# Patient Record
Sex: MALE | Race: WHITE | Employment: FULL TIME | URBAN - METROPOLITAN AREA
[De-identification: names, ages, dates, MRNs, and addresses within clinical notes are randomized per-mention and may not be internally consistent; named-entity substitution may affect disease eponyms.]

---

## 2019-11-22 SDOH — HEALTH STABILITY: MENTAL HEALTH: HOW OFTEN DO YOU HAVE A DRINK CONTAINING ALCOHOL?: NEVER

## 2019-11-27 ENCOUNTER — HOSPITAL ENCOUNTER (OUTPATIENT)
Dept: GENERAL RADIOLOGY | Age: 64
Discharge: HOME | End: 2019-11-27
Payer: COMMERCIAL

## 2019-11-27 ENCOUNTER — HOSPITAL ENCOUNTER (OUTPATIENT)
Age: 64
Discharge: HOME | End: 2019-11-27
Payer: COMMERCIAL

## 2019-11-27 DIAGNOSIS — Z01.818 PREOP TESTING: ICD-10-CM

## 2019-11-27 LAB
A/G RATIO: 1.6 (ref 1.1–2.2)
ABO/RH: NORMAL
ALBUMIN SERPL-MCNC: 4.4 G/DL (ref 3.4–5)
ALP BLD-CCNC: 50 U/L (ref 40–129)
ALT SERPL-CCNC: 20 U/L (ref 10–40)
ANION GAP SERPL CALCULATED.3IONS-SCNC: 11 MMOL/L (ref 3–16)
ANTIBODY SCREEN: NORMAL
APTT: 31.5 SEC (ref 24.2–36.2)
AST SERPL-CCNC: 24 U/L (ref 15–37)
BASOPHILS ABSOLUTE: 0 K/UL (ref 0–0.2)
BASOPHILS RELATIVE PERCENT: 0.8 %
BILIRUB SERPL-MCNC: 0.4 MG/DL (ref 0–1)
BUN BLDV-MCNC: 11 MG/DL (ref 7–20)
CALCIUM SERPL-MCNC: 9.6 MG/DL (ref 8.3–10.6)
CHLORIDE BLD-SCNC: 106 MMOL/L (ref 99–110)
CO2: 25 MMOL/L (ref 21–32)
CREAT SERPL-MCNC: 1.1 MG/DL (ref 0.8–1.3)
EKG ATRIAL RATE: 50 BPM
EKG DIAGNOSIS: NORMAL
EKG P AXIS: 22 DEGREES
EKG P-R INTERVAL: 150 MS
EKG Q-T INTERVAL: 424 MS
EKG QRS DURATION: 92 MS
EKG QTC CALCULATION (BAZETT): 386 MS
EKG R AXIS: 7 DEGREES
EKG T AXIS: 26 DEGREES
EKG VENTRICULAR RATE: 50 BPM
EOSINOPHILS ABSOLUTE: 0.2 K/UL (ref 0–0.6)
EOSINOPHILS RELATIVE PERCENT: 3.6 %
GFR AFRICAN AMERICAN: >60
GFR NON-AFRICAN AMERICAN: >60
GLOBULIN: 2.8 G/DL
GLUCOSE BLD-MCNC: 106 MG/DL (ref 70–99)
HCT VFR BLD CALC: 47.3 % (ref 40.5–52.5)
HEMOGLOBIN: 15.6 G/DL (ref 13.5–17.5)
INR BLD: 0.98 (ref 0.86–1.14)
LYMPHOCYTES ABSOLUTE: 1.2 K/UL (ref 1–5.1)
LYMPHOCYTES RELATIVE PERCENT: 20.1 %
MCH RBC QN AUTO: 29.2 PG (ref 26–34)
MCHC RBC AUTO-ENTMCNC: 33 G/DL (ref 31–36)
MCV RBC AUTO: 88.5 FL (ref 80–100)
MONOCYTES ABSOLUTE: 0.4 K/UL (ref 0–1.3)
MONOCYTES RELATIVE PERCENT: 7.5 %
NEUTROPHILS ABSOLUTE: 4 K/UL (ref 1.7–7.7)
NEUTROPHILS RELATIVE PERCENT: 68 %
PDW BLD-RTO: 13.6 % (ref 12.4–15.4)
PLATELET # BLD: 207 K/UL (ref 135–450)
PMV BLD AUTO: 9.3 FL (ref 5–10.5)
POTASSIUM SERPL-SCNC: 4.7 MMOL/L (ref 3.5–5.1)
PROTHROMBIN TIME: 11.4 SEC (ref 10–13.2)
RBC # BLD: 5.34 M/UL (ref 4.2–5.9)
SODIUM BLD-SCNC: 142 MMOL/L (ref 136–145)
TOTAL PROTEIN: 7.2 G/DL (ref 6.4–8.2)
WBC # BLD: 5.9 K/UL (ref 4–11)

## 2019-11-27 PROCEDURE — 71046 X-RAY EXAM CHEST 2 VIEWS: CPT

## 2019-11-27 PROCEDURE — 86901 BLOOD TYPING SEROLOGIC RH(D): CPT

## 2019-11-27 PROCEDURE — 93005 ELECTROCARDIOGRAM TRACING: CPT | Performed by: UROLOGY

## 2019-11-27 PROCEDURE — 93010 ELECTROCARDIOGRAM REPORT: CPT | Performed by: INTERNAL MEDICINE

## 2019-11-27 PROCEDURE — 85730 THROMBOPLASTIN TIME PARTIAL: CPT

## 2019-11-27 PROCEDURE — 86850 RBC ANTIBODY SCREEN: CPT

## 2019-11-27 PROCEDURE — 85025 COMPLETE CBC W/AUTO DIFF WBC: CPT

## 2019-11-27 PROCEDURE — 80053 COMPREHEN METABOLIC PANEL: CPT

## 2019-11-27 PROCEDURE — 36415 COLL VENOUS BLD VENIPUNCTURE: CPT

## 2019-11-27 PROCEDURE — 85610 PROTHROMBIN TIME: CPT

## 2019-11-27 PROCEDURE — 86900 BLOOD TYPING SEROLOGIC ABO: CPT

## 2019-12-17 ENCOUNTER — ANESTHESIA EVENT (OUTPATIENT)
Dept: OPERATING ROOM | Age: 64
End: 2019-12-17
Payer: COMMERCIAL

## 2019-12-17 ENCOUNTER — HOSPITAL ENCOUNTER (OUTPATIENT)
Age: 64
Setting detail: OBSERVATION
Discharge: HOME | End: 2019-12-18
Attending: UROLOGY | Admitting: UROLOGY
Payer: COMMERCIAL

## 2019-12-17 ENCOUNTER — ANESTHESIA (OUTPATIENT)
Dept: OPERATING ROOM | Age: 64
End: 2019-12-17
Payer: COMMERCIAL

## 2019-12-17 VITALS
OXYGEN SATURATION: 99 % | RESPIRATION RATE: 3 BRPM | SYSTOLIC BLOOD PRESSURE: 139 MMHG | DIASTOLIC BLOOD PRESSURE: 85 MMHG | TEMPERATURE: 95.9 F

## 2019-12-17 DIAGNOSIS — Z01.818 PREOP TESTING: Primary | ICD-10-CM

## 2019-12-17 DIAGNOSIS — C61 PROSTATE CANCER (HCC): ICD-10-CM

## 2019-12-17 LAB
ABO/RH: NORMAL
ANTIBODY SCREEN: NORMAL

## 2019-12-17 PROCEDURE — 94760 N-INVAS EAR/PLS OXIMETRY 1: CPT

## 2019-12-17 PROCEDURE — 7100000001 HC PACU RECOVERY - ADDTL 15 MIN: Performed by: UROLOGY

## 2019-12-17 PROCEDURE — 2580000003 HC RX 258: Performed by: UROLOGY

## 2019-12-17 PROCEDURE — 6360000002 HC RX W HCPCS: Performed by: ANESTHESIOLOGY

## 2019-12-17 PROCEDURE — 6360000002 HC RX W HCPCS: Performed by: NURSE ANESTHETIST, CERTIFIED REGISTERED

## 2019-12-17 PROCEDURE — 6370000000 HC RX 637 (ALT 250 FOR IP): Performed by: UROLOGY

## 2019-12-17 PROCEDURE — 3600000009 HC SURGERY ROBOT BASE: Performed by: UROLOGY

## 2019-12-17 PROCEDURE — 36415 COLL VENOUS BLD VENIPUNCTURE: CPT

## 2019-12-17 PROCEDURE — 88309 TISSUE EXAM BY PATHOLOGIST: CPT

## 2019-12-17 PROCEDURE — 6360000002 HC RX W HCPCS: Performed by: UROLOGY

## 2019-12-17 PROCEDURE — 86901 BLOOD TYPING SEROLOGIC RH(D): CPT

## 2019-12-17 PROCEDURE — 86900 BLOOD TYPING SEROLOGIC ABO: CPT

## 2019-12-17 PROCEDURE — C9290 INJ, BUPIVACAINE LIPOSOME: HCPCS | Performed by: UROLOGY

## 2019-12-17 PROCEDURE — 2720000010 HC SURG SUPPLY STERILE: Performed by: UROLOGY

## 2019-12-17 PROCEDURE — 1200000000 HC SEMI PRIVATE

## 2019-12-17 PROCEDURE — 3700000001 HC ADD 15 MINUTES (ANESTHESIA): Performed by: UROLOGY

## 2019-12-17 PROCEDURE — 2580000003 HC RX 258: Performed by: NURSE ANESTHETIST, CERTIFIED REGISTERED

## 2019-12-17 PROCEDURE — 3700000000 HC ANESTHESIA ATTENDED CARE: Performed by: UROLOGY

## 2019-12-17 PROCEDURE — 6370000000 HC RX 637 (ALT 250 FOR IP)

## 2019-12-17 PROCEDURE — 2709999900 HC NON-CHARGEABLE SUPPLY: Performed by: UROLOGY

## 2019-12-17 PROCEDURE — 2500000003 HC RX 250 WO HCPCS: Performed by: UROLOGY

## 2019-12-17 PROCEDURE — 7100000000 HC PACU RECOVERY - FIRST 15 MIN: Performed by: UROLOGY

## 2019-12-17 PROCEDURE — S2900 ROBOTIC SURGICAL SYSTEM: HCPCS | Performed by: UROLOGY

## 2019-12-17 PROCEDURE — G0378 HOSPITAL OBSERVATION PER HR: HCPCS

## 2019-12-17 PROCEDURE — 2580000003 HC RX 258: Performed by: ANESTHESIOLOGY

## 2019-12-17 PROCEDURE — 86850 RBC ANTIBODY SCREEN: CPT

## 2019-12-17 PROCEDURE — 3600000019 HC SURGERY ROBOT ADDTL 15MIN: Performed by: UROLOGY

## 2019-12-17 PROCEDURE — 2500000003 HC RX 250 WO HCPCS: Performed by: NURSE ANESTHETIST, CERTIFIED REGISTERED

## 2019-12-17 PROCEDURE — 88305 TISSUE EXAM BY PATHOLOGIST: CPT

## 2019-12-17 RX ORDER — NEOSTIGMINE METHYLSULFATE 5 MG/5 ML
SYRINGE (ML) INTRAVENOUS PRN
Status: DISCONTINUED | OUTPATIENT
Start: 2019-12-17 | End: 2019-12-17 | Stop reason: SDUPTHER

## 2019-12-17 RX ORDER — CEFAZOLIN SODIUM 2 G/100ML
2 INJECTION, SOLUTION INTRAVENOUS EVERY 8 HOURS
Status: COMPLETED | OUTPATIENT
Start: 2019-12-17 | End: 2019-12-18

## 2019-12-17 RX ORDER — KETAMINE HCL IN NACL, ISO-OSM 100MG/10ML
SYRINGE (ML) INJECTION PRN
Status: DISCONTINUED | OUTPATIENT
Start: 2019-12-17 | End: 2019-12-17 | Stop reason: SDUPTHER

## 2019-12-17 RX ORDER — LIDOCAINE HYDROCHLORIDE 10 MG/ML
1 INJECTION, SOLUTION EPIDURAL; INFILTRATION; INTRACAUDAL; PERINEURAL
Status: DISCONTINUED | OUTPATIENT
Start: 2019-12-17 | End: 2019-12-17 | Stop reason: HOSPADM

## 2019-12-17 RX ORDER — CEFAZOLIN SODIUM 2 G/100ML
2 INJECTION, SOLUTION INTRAVENOUS
Status: COMPLETED | OUTPATIENT
Start: 2019-12-17 | End: 2019-12-17

## 2019-12-17 RX ORDER — HYDROCODONE BITARTRATE AND ACETAMINOPHEN 5; 325 MG/1; MG/1
1 TABLET ORAL
Status: DISCONTINUED | OUTPATIENT
Start: 2019-12-17 | End: 2019-12-17 | Stop reason: HOSPADM

## 2019-12-17 RX ORDER — LANOLIN ALCOHOL/MO/W.PET/CERES
3 CREAM (GRAM) TOPICAL NIGHTLY PRN
Status: DISCONTINUED | OUTPATIENT
Start: 2019-12-17 | End: 2019-12-18 | Stop reason: HOSPADM

## 2019-12-17 RX ORDER — OXYCODONE HYDROCHLORIDE 5 MG/1
5 TABLET ORAL EVERY 4 HOURS PRN
Status: DISCONTINUED | OUTPATIENT
Start: 2019-12-17 | End: 2019-12-18 | Stop reason: HOSPADM

## 2019-12-17 RX ORDER — SODIUM CHLORIDE, SODIUM LACTATE, POTASSIUM CHLORIDE, CALCIUM CHLORIDE 600; 310; 30; 20 MG/100ML; MG/100ML; MG/100ML; MG/100ML
INJECTION, SOLUTION INTRAVENOUS CONTINUOUS
Status: DISCONTINUED | OUTPATIENT
Start: 2019-12-17 | End: 2019-12-17

## 2019-12-17 RX ORDER — LIDOCAINE HYDROCHLORIDE 10 MG/ML
0.5 INJECTION, SOLUTION EPIDURAL; INFILTRATION; INTRACAUDAL; PERINEURAL ONCE
Status: DISCONTINUED | OUTPATIENT
Start: 2019-12-17 | End: 2019-12-17 | Stop reason: HOSPADM

## 2019-12-17 RX ORDER — SODIUM CHLORIDE 9 MG/ML
INJECTION, SOLUTION INTRAVENOUS CONTINUOUS
Status: DISCONTINUED | OUTPATIENT
Start: 2019-12-17 | End: 2019-12-18 | Stop reason: HOSPADM

## 2019-12-17 RX ORDER — ACETAMINOPHEN 325 MG/1
TABLET ORAL
Status: COMPLETED
Start: 2019-12-17 | End: 2019-12-17

## 2019-12-17 RX ORDER — ONDANSETRON 2 MG/ML
INJECTION INTRAMUSCULAR; INTRAVENOUS PRN
Status: DISCONTINUED | OUTPATIENT
Start: 2019-12-17 | End: 2019-12-17 | Stop reason: SDUPTHER

## 2019-12-17 RX ORDER — FENTANYL CITRATE 50 UG/ML
INJECTION, SOLUTION INTRAMUSCULAR; INTRAVENOUS PRN
Status: DISCONTINUED | OUTPATIENT
Start: 2019-12-17 | End: 2019-12-17 | Stop reason: SDUPTHER

## 2019-12-17 RX ORDER — ONDANSETRON 2 MG/ML
4 INJECTION INTRAMUSCULAR; INTRAVENOUS
Status: DISCONTINUED | OUTPATIENT
Start: 2019-12-17 | End: 2019-12-17 | Stop reason: HOSPADM

## 2019-12-17 RX ORDER — HYDROCODONE BITARTRATE AND ACETAMINOPHEN 5; 325 MG/1; MG/1
1 TABLET ORAL EVERY 6 HOURS PRN
Qty: 12 TABLET | Refills: 0 | Status: SHIPPED | OUTPATIENT
Start: 2019-12-17 | End: 2019-12-20

## 2019-12-17 RX ORDER — MORPHINE SULFATE 2 MG/ML
2 INJECTION, SOLUTION INTRAMUSCULAR; INTRAVENOUS
Status: DISCONTINUED | OUTPATIENT
Start: 2019-12-17 | End: 2019-12-18 | Stop reason: HOSPADM

## 2019-12-17 RX ORDER — ACETAMINOPHEN 325 MG/1
650 TABLET ORAL EVERY 6 HOURS
Status: DISCONTINUED | OUTPATIENT
Start: 2019-12-17 | End: 2019-12-18 | Stop reason: HOSPADM

## 2019-12-17 RX ORDER — ATROPA BELLADONNA AND OPIUM 16.2; 3 MG/1; MG/1
30 SUPPOSITORY RECTAL EVERY 8 HOURS PRN
Status: DISCONTINUED | OUTPATIENT
Start: 2019-12-17 | End: 2019-12-18 | Stop reason: HOSPADM

## 2019-12-17 RX ORDER — OXYCODONE HYDROCHLORIDE 5 MG/1
10 TABLET ORAL EVERY 4 HOURS PRN
Status: DISCONTINUED | OUTPATIENT
Start: 2019-12-17 | End: 2019-12-18 | Stop reason: HOSPADM

## 2019-12-17 RX ORDER — GINSENG 100 MG
CAPSULE ORAL 2 TIMES DAILY
Status: DISCONTINUED | OUTPATIENT
Start: 2019-12-17 | End: 2019-12-18 | Stop reason: HOSPADM

## 2019-12-17 RX ORDER — SODIUM CHLORIDE 0.9 % (FLUSH) 0.9 %
10 SYRINGE (ML) INJECTION EVERY 12 HOURS SCHEDULED
Status: DISCONTINUED | OUTPATIENT
Start: 2019-12-17 | End: 2019-12-18 | Stop reason: HOSPADM

## 2019-12-17 RX ORDER — LIDOCAINE HYDROCHLORIDE 20 MG/ML
JELLY TOPICAL
Status: COMPLETED | OUTPATIENT
Start: 2019-12-17 | End: 2019-12-17

## 2019-12-17 RX ORDER — SODIUM CHLORIDE 9 MG/ML
INJECTION, SOLUTION INTRAVENOUS CONTINUOUS PRN
Status: DISCONTINUED | OUTPATIENT
Start: 2019-12-17 | End: 2019-12-17 | Stop reason: SDUPTHER

## 2019-12-17 RX ORDER — MAGNESIUM HYDROXIDE 1200 MG/15ML
LIQUID ORAL CONTINUOUS PRN
Status: COMPLETED | OUTPATIENT
Start: 2019-12-17 | End: 2019-12-17

## 2019-12-17 RX ORDER — MORPHINE SULFATE 4 MG/ML
4 INJECTION, SOLUTION INTRAMUSCULAR; INTRAVENOUS
Status: DISCONTINUED | OUTPATIENT
Start: 2019-12-17 | End: 2019-12-18 | Stop reason: HOSPADM

## 2019-12-17 RX ORDER — PROPOFOL 10 MG/ML
INJECTION, EMULSION INTRAVENOUS PRN
Status: DISCONTINUED | OUTPATIENT
Start: 2019-12-17 | End: 2019-12-17 | Stop reason: SDUPTHER

## 2019-12-17 RX ORDER — SODIUM CHLORIDE 0.9 % (FLUSH) 0.9 %
10 SYRINGE (ML) INJECTION PRN
Status: DISCONTINUED | OUTPATIENT
Start: 2019-12-17 | End: 2019-12-18 | Stop reason: HOSPADM

## 2019-12-17 RX ORDER — ATROPA BELLADONNA AND OPIUM 16.2; 6 MG/1; MG/1
SUPPOSITORY RECTAL
Status: COMPLETED | OUTPATIENT
Start: 2019-12-17 | End: 2019-12-17

## 2019-12-17 RX ORDER — ONDANSETRON 2 MG/ML
4 INJECTION INTRAMUSCULAR; INTRAVENOUS EVERY 6 HOURS PRN
Status: DISCONTINUED | OUTPATIENT
Start: 2019-12-17 | End: 2019-12-18 | Stop reason: HOSPADM

## 2019-12-17 RX ORDER — SENNA AND DOCUSATE SODIUM 50; 8.6 MG/1; MG/1
1 TABLET, FILM COATED ORAL 2 TIMES DAILY
Status: DISCONTINUED | OUTPATIENT
Start: 2019-12-17 | End: 2019-12-18 | Stop reason: HOSPADM

## 2019-12-17 RX ORDER — BISACODYL 10 MG
10 SUPPOSITORY, RECTAL RECTAL DAILY PRN
Status: DISCONTINUED | OUTPATIENT
Start: 2019-12-17 | End: 2019-12-18 | Stop reason: HOSPADM

## 2019-12-17 RX ORDER — HYDROMORPHONE HCL 110MG/55ML
0.5 PATIENT CONTROLLED ANALGESIA SYRINGE INTRAVENOUS EVERY 5 MIN PRN
Status: DISCONTINUED | OUTPATIENT
Start: 2019-12-17 | End: 2019-12-17 | Stop reason: HOSPADM

## 2019-12-17 RX ORDER — HYDROMORPHONE HCL 110MG/55ML
0.25 PATIENT CONTROLLED ANALGESIA SYRINGE INTRAVENOUS EVERY 5 MIN PRN
Status: DISCONTINUED | OUTPATIENT
Start: 2019-12-17 | End: 2019-12-17 | Stop reason: HOSPADM

## 2019-12-17 RX ORDER — LIDOCAINE HYDROCHLORIDE 20 MG/ML
INJECTION, SOLUTION EPIDURAL; INFILTRATION; INTRACAUDAL; PERINEURAL PRN
Status: DISCONTINUED | OUTPATIENT
Start: 2019-12-17 | End: 2019-12-17 | Stop reason: SDUPTHER

## 2019-12-17 RX ORDER — DEXAMETHASONE SODIUM PHOSPHATE 4 MG/ML
INJECTION, SOLUTION INTRA-ARTICULAR; INTRALESIONAL; INTRAMUSCULAR; INTRAVENOUS; SOFT TISSUE PRN
Status: DISCONTINUED | OUTPATIENT
Start: 2019-12-17 | End: 2019-12-17

## 2019-12-17 RX ORDER — MAGNESIUM SULFATE HEPTAHYDRATE 500 MG/ML
INJECTION, SOLUTION INTRAMUSCULAR; INTRAVENOUS PRN
Status: DISCONTINUED | OUTPATIENT
Start: 2019-12-17 | End: 2019-12-17 | Stop reason: SDUPTHER

## 2019-12-17 RX ORDER — ACETAMINOPHEN 325 MG/1
650 TABLET ORAL ONCE
Status: COMPLETED | OUTPATIENT
Start: 2019-12-17 | End: 2019-12-17

## 2019-12-17 RX ORDER — GLYCOPYRROLATE 1 MG/5 ML
SYRINGE (ML) INTRAVENOUS PRN
Status: DISCONTINUED | OUTPATIENT
Start: 2019-12-17 | End: 2019-12-17 | Stop reason: SDUPTHER

## 2019-12-17 RX ORDER — DEXAMETHASONE SODIUM PHOSPHATE 4 MG/ML
INJECTION, SOLUTION INTRA-ARTICULAR; INTRALESIONAL; INTRAMUSCULAR; INTRAVENOUS; SOFT TISSUE PRN
Status: DISCONTINUED | OUTPATIENT
Start: 2019-12-17 | End: 2019-12-17 | Stop reason: SDUPTHER

## 2019-12-17 RX ORDER — ROCURONIUM BROMIDE 10 MG/ML
INJECTION, SOLUTION INTRAVENOUS PRN
Status: DISCONTINUED | OUTPATIENT
Start: 2019-12-17 | End: 2019-12-17 | Stop reason: SDUPTHER

## 2019-12-17 RX ORDER — AMOXICILLIN 250 MG
1 CAPSULE ORAL 2 TIMES DAILY
Qty: 50 TABLET | Refills: 0 | Status: SHIPPED | OUTPATIENT
Start: 2019-12-17

## 2019-12-17 RX ADMIN — FENTANYL CITRATE 100 MCG: 50 INJECTION, SOLUTION INTRAMUSCULAR; INTRAVENOUS at 11:26

## 2019-12-17 RX ADMIN — DEXAMETHASONE SODIUM PHOSPHATE 10 MG: 4 INJECTION, SOLUTION INTRAMUSCULAR; INTRAVENOUS at 09:34

## 2019-12-17 RX ADMIN — Medication 10 ML: at 20:17

## 2019-12-17 RX ADMIN — ROCURONIUM BROMIDE 50 MG: 10 INJECTION, SOLUTION INTRAVENOUS at 07:47

## 2019-12-17 RX ADMIN — Medication 0.3 MG: at 09:33

## 2019-12-17 RX ADMIN — ROCURONIUM BROMIDE 10 MG: 10 INJECTION, SOLUTION INTRAVENOUS at 10:05

## 2019-12-17 RX ADMIN — SODIUM CHLORIDE: 9 INJECTION, SOLUTION INTRAVENOUS at 11:45

## 2019-12-17 RX ADMIN — Medication 10 MG: at 10:07

## 2019-12-17 RX ADMIN — ONDANSETRON 4 MG: 2 INJECTION INTRAMUSCULAR; INTRAVENOUS at 09:35

## 2019-12-17 RX ADMIN — Medication 4 MG: at 11:27

## 2019-12-17 RX ADMIN — LIDOCAINE HYDROCHLORIDE 80 MG: 20 INJECTION, SOLUTION EPIDURAL; INFILTRATION; INTRACAUDAL; PERINEURAL at 07:47

## 2019-12-17 RX ADMIN — OXYCODONE HYDROCHLORIDE 10 MG: 5 TABLET ORAL at 16:05

## 2019-12-17 RX ADMIN — SODIUM CHLORIDE: 9 INJECTION, SOLUTION INTRAVENOUS at 06:15

## 2019-12-17 RX ADMIN — OXYCODONE HYDROCHLORIDE 10 MG: 5 TABLET ORAL at 20:34

## 2019-12-17 RX ADMIN — BACITRACIN: 500 OINTMENT TOPICAL at 20:16

## 2019-12-17 RX ADMIN — SODIUM CHLORIDE: 9 INJECTION, SOLUTION INTRAVENOUS at 07:37

## 2019-12-17 RX ADMIN — ACETAMINOPHEN 650 MG: 325 TABLET, FILM COATED ORAL at 07:06

## 2019-12-17 RX ADMIN — MAGNESIUM SULFATE HEPTAHYDRATE 2 G: 500 INJECTION, SOLUTION INTRAMUSCULAR; INTRAVENOUS at 08:11

## 2019-12-17 RX ADMIN — PROPOFOL 20 MG: 10 INJECTION, EMULSION INTRAVENOUS at 07:41

## 2019-12-17 RX ADMIN — Medication 0.5 MG: at 11:27

## 2019-12-17 RX ADMIN — HYDROMORPHONE HYDROCHLORIDE 0.5 MG: 2 INJECTION INTRAMUSCULAR; INTRAVENOUS; SUBCUTANEOUS at 14:02

## 2019-12-17 RX ADMIN — ACETAMINOPHEN 650 MG: 325 TABLET, FILM COATED ORAL at 16:04

## 2019-12-17 RX ADMIN — CEFAZOLIN SODIUM 2 G: 2 INJECTION, SOLUTION INTRAVENOUS at 07:35

## 2019-12-17 RX ADMIN — PROPOFOL 20 MG: 10 INJECTION, EMULSION INTRAVENOUS at 07:45

## 2019-12-17 RX ADMIN — ACETAMINOPHEN 650 MG: 325 TABLET, FILM COATED ORAL at 23:06

## 2019-12-17 RX ADMIN — SENNOSIDES AND DOCUSATE SODIUM 1 TABLET: 8.6; 5 TABLET ORAL at 20:16

## 2019-12-17 RX ADMIN — Medication 10 MG: at 09:04

## 2019-12-17 RX ADMIN — FENTANYL CITRATE 50 MCG: 50 INJECTION, SOLUTION INTRAMUSCULAR; INTRAVENOUS at 07:45

## 2019-12-17 RX ADMIN — SODIUM CHLORIDE: 9 INJECTION, SOLUTION INTRAVENOUS at 20:16

## 2019-12-17 RX ADMIN — CEFAZOLIN SODIUM 2 G: 2 INJECTION, SOLUTION INTRAVENOUS at 23:07

## 2019-12-17 RX ADMIN — CEFAZOLIN SODIUM 2 G: 2 INJECTION, SOLUTION INTRAVENOUS at 16:05

## 2019-12-17 RX ADMIN — Medication 10 MG: at 07:46

## 2019-12-17 RX ADMIN — FENTANYL CITRATE 50 MCG: 50 INJECTION, SOLUTION INTRAMUSCULAR; INTRAVENOUS at 07:41

## 2019-12-17 RX ADMIN — PROPOFOL 160 MG: 10 INJECTION, EMULSION INTRAVENOUS at 07:47

## 2019-12-17 RX ADMIN — ACETAMINOPHEN 650 MG: 325 TABLET ORAL at 07:06

## 2019-12-17 RX ADMIN — ROCURONIUM BROMIDE 20 MG: 10 INJECTION, SOLUTION INTRAVENOUS at 09:16

## 2019-12-17 RX ADMIN — MELATONIN TAB 3 MG 3 MG: 3 TAB at 20:35

## 2019-12-17 ASSESSMENT — PULMONARY FUNCTION TESTS
PIF_VALUE: 38
PIF_VALUE: 37
PIF_VALUE: 39
PIF_VALUE: 38
PIF_VALUE: 36
PIF_VALUE: 36
PIF_VALUE: 25
PIF_VALUE: 38
PIF_VALUE: 22
PIF_VALUE: 38
PIF_VALUE: 37
PIF_VALUE: 0
PIF_VALUE: 21
PIF_VALUE: 34
PIF_VALUE: 40
PIF_VALUE: 38
PIF_VALUE: 21
PIF_VALUE: 0
PIF_VALUE: 36
PIF_VALUE: 38
PIF_VALUE: 36
PIF_VALUE: 26
PIF_VALUE: 38
PIF_VALUE: 37
PIF_VALUE: 22
PIF_VALUE: 36
PIF_VALUE: 36
PIF_VALUE: 37
PIF_VALUE: 15
PIF_VALUE: 24
PIF_VALUE: 13
PIF_VALUE: 32
PIF_VALUE: 37
PIF_VALUE: 1
PIF_VALUE: 28
PIF_VALUE: 22
PIF_VALUE: 38
PIF_VALUE: 37
PIF_VALUE: 38
PIF_VALUE: 31
PIF_VALUE: 38
PIF_VALUE: 2
PIF_VALUE: 30
PIF_VALUE: 25
PIF_VALUE: 22
PIF_VALUE: 37
PIF_VALUE: 38
PIF_VALUE: 38
PIF_VALUE: 26
PIF_VALUE: 22
PIF_VALUE: 38
PIF_VALUE: 22
PIF_VALUE: 25
PIF_VALUE: 37
PIF_VALUE: 38
PIF_VALUE: 40
PIF_VALUE: 38
PIF_VALUE: 0
PIF_VALUE: 38
PIF_VALUE: 36
PIF_VALUE: 0
PIF_VALUE: 2
PIF_VALUE: 7
PIF_VALUE: 36
PIF_VALUE: 22
PIF_VALUE: 38
PIF_VALUE: 22
PIF_VALUE: 37
PIF_VALUE: 38
PIF_VALUE: 39
PIF_VALUE: 38
PIF_VALUE: 30
PIF_VALUE: 36
PIF_VALUE: 38
PIF_VALUE: 22
PIF_VALUE: 39
PIF_VALUE: 0
PIF_VALUE: 38
PIF_VALUE: 0
PIF_VALUE: 38
PIF_VALUE: 21
PIF_VALUE: 31
PIF_VALUE: 21
PIF_VALUE: 36
PIF_VALUE: 40
PIF_VALUE: 38
PIF_VALUE: 37
PIF_VALUE: 36
PIF_VALUE: 36
PIF_VALUE: 38
PIF_VALUE: 34
PIF_VALUE: 38
PIF_VALUE: 35
PIF_VALUE: 14
PIF_VALUE: 38
PIF_VALUE: 38
PIF_VALUE: 13
PIF_VALUE: 16
PIF_VALUE: 36
PIF_VALUE: 38
PIF_VALUE: 40
PIF_VALUE: 38
PIF_VALUE: 0
PIF_VALUE: 35
PIF_VALUE: 31
PIF_VALUE: 21
PIF_VALUE: 38
PIF_VALUE: 15
PIF_VALUE: 37
PIF_VALUE: 39
PIF_VALUE: 38
PIF_VALUE: 32
PIF_VALUE: 29
PIF_VALUE: 29
PIF_VALUE: 38
PIF_VALUE: 38
PIF_VALUE: 21
PIF_VALUE: 0
PIF_VALUE: 37
PIF_VALUE: 37
PIF_VALUE: 23
PIF_VALUE: 20
PIF_VALUE: 36
PIF_VALUE: 37
PIF_VALUE: 37
PIF_VALUE: 4
PIF_VALUE: 37
PIF_VALUE: 37
PIF_VALUE: 38
PIF_VALUE: 31
PIF_VALUE: 1
PIF_VALUE: 22
PIF_VALUE: 37
PIF_VALUE: 2
PIF_VALUE: 38
PIF_VALUE: 40
PIF_VALUE: 38
PIF_VALUE: 24
PIF_VALUE: 37
PIF_VALUE: 38
PIF_VALUE: 39
PIF_VALUE: 23
PIF_VALUE: 38
PIF_VALUE: 1
PIF_VALUE: 22
PIF_VALUE: 37
PIF_VALUE: 1
PIF_VALUE: 36
PIF_VALUE: 38
PIF_VALUE: 23
PIF_VALUE: 22
PIF_VALUE: 36
PIF_VALUE: 18
PIF_VALUE: 24
PIF_VALUE: 38
PIF_VALUE: 5
PIF_VALUE: 38
PIF_VALUE: 36
PIF_VALUE: 36
PIF_VALUE: 35
PIF_VALUE: 38
PIF_VALUE: 39
PIF_VALUE: 38
PIF_VALUE: 38
PIF_VALUE: 22
PIF_VALUE: 38
PIF_VALUE: 38
PIF_VALUE: 36
PIF_VALUE: 38
PIF_VALUE: 24
PIF_VALUE: 37
PIF_VALUE: 35
PIF_VALUE: 38
PIF_VALUE: 22
PIF_VALUE: 37
PIF_VALUE: 5
PIF_VALUE: 38
PIF_VALUE: 40
PIF_VALUE: 1
PIF_VALUE: 31
PIF_VALUE: 37
PIF_VALUE: 20
PIF_VALUE: 37
PIF_VALUE: 39
PIF_VALUE: 38
PIF_VALUE: 0
PIF_VALUE: 36
PIF_VALUE: 39
PIF_VALUE: 21
PIF_VALUE: 24
PIF_VALUE: 38
PIF_VALUE: 36
PIF_VALUE: 38
PIF_VALUE: 15
PIF_VALUE: 38
PIF_VALUE: 36
PIF_VALUE: 38
PIF_VALUE: 21
PIF_VALUE: 38
PIF_VALUE: 38
PIF_VALUE: 36
PIF_VALUE: 38
PIF_VALUE: 37
PIF_VALUE: 36
PIF_VALUE: 35
PIF_VALUE: 38
PIF_VALUE: 36
PIF_VALUE: 37
PIF_VALUE: 38
PIF_VALUE: 37
PIF_VALUE: 3
PIF_VALUE: 40
PIF_VALUE: 36
PIF_VALUE: 14
PIF_VALUE: 30
PIF_VALUE: 38
PIF_VALUE: 38
PIF_VALUE: 30
PIF_VALUE: 37
PIF_VALUE: 38
PIF_VALUE: 38
PIF_VALUE: 29
PIF_VALUE: 36
PIF_VALUE: 38
PIF_VALUE: 22
PIF_VALUE: 22
PIF_VALUE: 37
PIF_VALUE: 20
PIF_VALUE: 37
PIF_VALUE: 23
PIF_VALUE: 37
PIF_VALUE: 23
PIF_VALUE: 38
PIF_VALUE: 1
PIF_VALUE: 38
PIF_VALUE: 38
PIF_VALUE: 4
PIF_VALUE: 37
PIF_VALUE: 0

## 2019-12-17 ASSESSMENT — PAIN SCALES - GENERAL
PAINLEVEL_OUTOF10: 5
PAINLEVEL_OUTOF10: 7
PAINLEVEL_OUTOF10: 0
PAINLEVEL_OUTOF10: 0
PAINLEVEL_OUTOF10: 7
PAINLEVEL_OUTOF10: 4
PAINLEVEL_OUTOF10: 0
PAINLEVEL_OUTOF10: 0
PAINLEVEL_OUTOF10: 7
PAINLEVEL_OUTOF10: 4
PAINLEVEL_OUTOF10: 7
PAINLEVEL_OUTOF10: 4

## 2019-12-17 ASSESSMENT — PAIN DESCRIPTION - PAIN TYPE
TYPE: SURGICAL PAIN

## 2019-12-17 ASSESSMENT — PAIN DESCRIPTION - LOCATION
LOCATION: ABDOMEN

## 2019-12-17 ASSESSMENT — PAIN DESCRIPTION - ORIENTATION
ORIENTATION: LOWER

## 2019-12-17 ASSESSMENT — PAIN DESCRIPTION - DESCRIPTORS
DESCRIPTORS: ACHING

## 2019-12-17 ASSESSMENT — PAIN - FUNCTIONAL ASSESSMENT: PAIN_FUNCTIONAL_ASSESSMENT: 0-10

## 2019-12-18 VITALS
HEART RATE: 61 BPM | TEMPERATURE: 97.9 F | SYSTOLIC BLOOD PRESSURE: 112 MMHG | HEIGHT: 68 IN | OXYGEN SATURATION: 92 % | DIASTOLIC BLOOD PRESSURE: 67 MMHG | RESPIRATION RATE: 16 BRPM | WEIGHT: 217 LBS | BODY MASS INDEX: 32.89 KG/M2

## 2019-12-18 LAB
ANION GAP SERPL CALCULATED.3IONS-SCNC: 13 MMOL/L (ref 3–16)
BUN BLDV-MCNC: 14 MG/DL (ref 7–20)
CALCIUM SERPL-MCNC: 8.4 MG/DL (ref 8.3–10.6)
CHLORIDE BLD-SCNC: 103 MMOL/L (ref 99–110)
CO2: 20 MMOL/L (ref 21–32)
CREAT SERPL-MCNC: 1.1 MG/DL (ref 0.8–1.3)
GFR AFRICAN AMERICAN: >60
GFR NON-AFRICAN AMERICAN: >60
GLUCOSE BLD-MCNC: 159 MG/DL (ref 70–99)
HCT VFR BLD CALC: 40.6 % (ref 40.5–52.5)
HEMOGLOBIN: 13.5 G/DL (ref 13.5–17.5)
POTASSIUM SERPL-SCNC: 4.1 MMOL/L (ref 3.5–5.1)
SODIUM BLD-SCNC: 136 MMOL/L (ref 136–145)

## 2019-12-18 PROCEDURE — 80048 BASIC METABOLIC PNL TOTAL CA: CPT

## 2019-12-18 PROCEDURE — 36415 COLL VENOUS BLD VENIPUNCTURE: CPT

## 2019-12-18 PROCEDURE — G0378 HOSPITAL OBSERVATION PER HR: HCPCS

## 2019-12-18 PROCEDURE — 2580000003 HC RX 258: Performed by: UROLOGY

## 2019-12-18 PROCEDURE — 85018 HEMOGLOBIN: CPT

## 2019-12-18 PROCEDURE — 6370000000 HC RX 637 (ALT 250 FOR IP): Performed by: UROLOGY

## 2019-12-18 PROCEDURE — 85014 HEMATOCRIT: CPT

## 2019-12-18 RX ADMIN — ACETAMINOPHEN 650 MG: 325 TABLET, FILM COATED ORAL at 10:17

## 2019-12-18 RX ADMIN — Medication 10 ML: at 08:49

## 2019-12-18 RX ADMIN — SODIUM CHLORIDE: 9 INJECTION, SOLUTION INTRAVENOUS at 04:36

## 2019-12-18 RX ADMIN — BACITRACIN: 500 OINTMENT TOPICAL at 08:49

## 2019-12-18 RX ADMIN — ACETAMINOPHEN 650 MG: 325 TABLET, FILM COATED ORAL at 04:32

## 2019-12-18 RX ADMIN — OXYCODONE HYDROCHLORIDE 10 MG: 5 TABLET ORAL at 04:32

## 2019-12-18 RX ADMIN — SENNOSIDES AND DOCUSATE SODIUM 1 TABLET: 8.6; 5 TABLET ORAL at 08:49

## 2019-12-18 ASSESSMENT — PAIN SCALES - GENERAL
PAINLEVEL_OUTOF10: 7
PAINLEVEL_OUTOF10: 5
PAINLEVEL_OUTOF10: 0

## 2023-02-13 ENCOUNTER — HOSPITAL ENCOUNTER (OUTPATIENT)
Dept: CT IMAGING | Age: 68
Discharge: HOME | End: 2023-02-13

## 2023-02-13 DIAGNOSIS — C61 PROSTATE CANCER (HCC): ICD-10-CM

## 2024-02-15 ENCOUNTER — OFFICE VISIT (OUTPATIENT)
Age: 69
End: 2024-02-15

## 2024-02-15 VITALS
TEMPERATURE: 97.5 F | SYSTOLIC BLOOD PRESSURE: 133 MMHG | DIASTOLIC BLOOD PRESSURE: 80 MMHG | WEIGHT: 215.6 LBS | BODY MASS INDEX: 32.78 KG/M2 | HEART RATE: 61 BPM | OXYGEN SATURATION: 98 %

## 2024-02-15 DIAGNOSIS — J01.90 ACUTE BACTERIAL SINUSITIS: Primary | ICD-10-CM

## 2024-02-15 DIAGNOSIS — B96.89 ACUTE BACTERIAL SINUSITIS: Primary | ICD-10-CM

## 2024-02-15 RX ORDER — ROSUVASTATIN CALCIUM 10 MG/1
10 TABLET, COATED ORAL DAILY
COMMUNITY
Start: 2023-09-26

## 2024-02-15 RX ORDER — FLUTICASONE PROPIONATE 50 MCG
2 SPRAY, SUSPENSION (ML) NASAL DAILY
Qty: 16 G | Refills: 0 | Status: SHIPPED | OUTPATIENT
Start: 2024-02-15

## 2024-02-15 RX ORDER — AZITHROMYCIN 250 MG/1
250 TABLET, FILM COATED ORAL SEE ADMIN INSTRUCTIONS
Qty: 6 TABLET | Refills: 0 | Status: SHIPPED | OUTPATIENT
Start: 2024-02-15 | End: 2024-02-20

## 2024-02-15 RX ORDER — VALSARTAN 160 MG/1
160 TABLET ORAL DAILY
COMMUNITY

## 2024-02-15 RX ORDER — NEBIVOLOL 5 MG/1
5 TABLET ORAL DAILY
COMMUNITY

## 2024-02-15 NOTE — PATIENT INSTRUCTIONS
Discharge medications reviewed with the patient and appropriate educational materials and side effects teaching were provided.

## 2024-02-15 NOTE — PROGRESS NOTES
Israel Simmons (:  1955) is a 68 y.o. male,New patient, here for evaluation of the following chief complaint(s):  Sinusitis (Headache, congestions, cough x 1 1/2 days)      ASSESSMENT/PLAN:  Visit Diagnoses and Associated Orders       Acute bacterial sinusitis    -  Primary    azithromycin (ZITHROMAX) 250 MG tablet [61575]      fluticasone (FLONASE) 50 MCG/ACT nasal spray [34536]           ORDERS WITHOUT AN ASSOCIATED DIAGNOSIS    valsartan (DIOVAN) 160 MG tablet [[id number]]      dapagliflozin (FARXIGA) 10 MG tablet [500082]      rosuvastatin (CRESTOR) 10 MG tablet [[id number]]      nebivolol (BYSTOLIC) 5 MG tablet [10193]          Increase fluids (preferably with electrolytes) and rest.  Emergency follow up required for symptoms including, but not limited to, shortness of breath, chest pain, mental status change, fevers >101, difficulty or inability to swallow, dehydration, or if symptoms worsen.  See printed instructions given at discharge.     SUBJECTIVE/OBJECTIVE:  Frontal/maxillary sinus Headache, chills, yellow sinus/nasal congestions, and yellow productive cough   Bilateral TM are bulging and throat is mildly red and swollen        History provided by:  Patient    HPI:   68 y.o. male presents with symptoms of Sinus Pain  Patient complains of congestion, cough, facial pain, foul rhinorrhea, nasal congestion, post nasal drip, purulent rhinorrhea, sinus pressure, and sore throat. Onset of symptoms was 2 days ago. Symptoms have been gradually worsening since that time.          Vitals:    02/15/24 0908   BP: 133/80   Site: Right Upper Arm   Position: Sitting   Cuff Size: Medium Adult   Pulse: 61   Temp: 97.5 °F (36.4 °C)   TempSrc: Oral   SpO2: 98%   Weight: 97.8 kg (215 lb 9.6 oz)         Physical Exam  Constitutional:       General: He is not in acute distress.     Appearance: Normal appearance. He is well-developed.   HENT:      Right Ear: External ear normal. Tenderness present. Tympanic membrane is

## 2025-05-24 ENCOUNTER — OFFICE VISIT (OUTPATIENT)
Age: 70
End: 2025-05-24

## 2025-05-24 VITALS
RESPIRATION RATE: 16 BRPM | WEIGHT: 215 LBS | OXYGEN SATURATION: 95 % | BODY MASS INDEX: 32.69 KG/M2 | TEMPERATURE: 98.2 F | DIASTOLIC BLOOD PRESSURE: 82 MMHG | HEART RATE: 95 BPM | SYSTOLIC BLOOD PRESSURE: 122 MMHG

## 2025-05-24 DIAGNOSIS — J01.40 ACUTE NON-RECURRENT PANSINUSITIS: Primary | ICD-10-CM

## 2025-05-24 ASSESSMENT — ENCOUNTER SYMPTOMS: COUGH: 1

## 2025-05-24 NOTE — PATIENT INSTRUCTIONS
Prescription for Augmentin.  Use Zyrtec, Flonase, and Mucinex D.   Drink plenty of fluids to thin mucus.  Sleep with your head propped up on a pillow.  Inhale steam three of four times daily( exp: sit in bathroom with hot shower running.)  Avoid Trigger and not smoking.  May also clean nasal passages with salt water to ease congestion. Use over-the-counter Tylenol and Motrin for pain or fever. Follow-up with their PCP or [facility] in 3 to 5 days and worsening symptoms.

## 2025-05-24 NOTE — PROGRESS NOTES
supraclavicular adenopathy.      Left upper body: No supraclavicular adenopathy.   Skin:     General: Skin is warm and dry.      Coloration: Skin is not pale.      Findings: No rash.      Comments: Skin intact, warm and dry to touch, no rashes noted on exposed surfaces.   Neurological:      Mental Status: He is alert and oriented to person, place, and time. He is not disoriented.   Psychiatric:         Mood and Affect: Mood normal.         Behavior: Behavior is cooperative.               An electronic signature was used to authenticate this note.    Destinee Padgett, JOSE - CNP

## (undated) DEVICE — CHLORAPREP 26ML ORANGE

## (undated) DEVICE — Z INACTIVE USE 2641839 CLIP INT M L POLYMER LOK LIG HEM O LOK

## (undated) DEVICE — CATHETER TRAY 16 FR 5 CC FOL ANTIREFLX SAMPLING PRT DOVER

## (undated) DEVICE — ARM DRAPE

## (undated) DEVICE — SYRINGE, LUER LOCK, 10ML: Brand: MEDLINE

## (undated) DEVICE — TISSUE RETRIEVAL SYSTEM: Brand: INZII RETRIEVAL SYSTEM

## (undated) DEVICE — TROCAR: Brand: KII FIOS FIRST ENTRY

## (undated) DEVICE — TRAY PREP DRY W/ PREM GLV 2 APPL 6 SPNG 2 UNDPD 1 OVERWRAP

## (undated) DEVICE — SOLUTION ANTIFOG VIS SYS CLEARIFY LAPSCP

## (undated) DEVICE — BLANKET WRM W29.9XL79.1IN UP BODY FORC AIR MISTRAL-AIR

## (undated) DEVICE — 3M™ TEGADERM™ TRANSPARENT FILM DRESSING FRAME STYLE, 1628, 6 IN X 8 IN (15 CM X 20 CM), 10/CT 8CT/CASE: Brand: 3M™ TEGADERM™

## (undated) DEVICE — ADHESIVE SKIN CLSR 0.7ML TOP DERMBND ADV

## (undated) DEVICE — 3M™ STERI-DRAPE™ INCISE DRAPE 1050 (60CM X 45CM): Brand: STERI-DRAPE™

## (undated) DEVICE — COVER EQUIP STEEP TREND EZ CLN UP WTRPRF DISP FOR STD SZ

## (undated) DEVICE — PENCIL ES L3M BTTN SWCH S STL HEX LOK BLDE ELECTRD HOLSTER

## (undated) DEVICE — TROCAR LAP L100MM DIA5MM BLDELSS W/ STBL SL ENDOPATH XCEL

## (undated) DEVICE — STERILE LATEX POWDER FREE SURGICAL GLOVES WITH HYDROGEL COATING: Brand: PROTEXIS

## (undated) DEVICE — ROBOTIC PK

## (undated) DEVICE — MERCY FAIRFIELD TURNOVER KIT: Brand: MEDLINE INDUSTRIES, INC.

## (undated) DEVICE — PROTECTOR EYE PT SELF ADH NS OPT GRD LF

## (undated) DEVICE — SOLUTION IV IRRIG POUR BRL 0.9% SODIUM CHL 2F7124

## (undated) DEVICE — AIRSEAL 8 MM ACCESS PORT AND LOW PROFILE OBTURATOR WITH BLADELESS OPTICAL TIP, 120 MM LENGTH: Brand: AIRSEAL

## (undated) DEVICE — INSUFFLATION NEEDLE TO ESTABLISH PNEUMOPERITONEUM.: Brand: INSUFFLATION NEEDLE

## (undated) DEVICE — SUTURE MCRYL SZ 4-0 L27IN ABSRB UD L19MM PS-2 1/2 CIR PRIM Y426H

## (undated) DEVICE — INVIEW CLEAR LEGGINGS: Brand: CONVERTORS

## (undated) DEVICE — TRI-LUMEN FILTERED TUBE SET WITH ACTIVATED CHARCOAL FILTER: Brand: AIRSEAL

## (undated) DEVICE — BLADELESS OBTURATOR: Brand: WECK VISTA

## (undated) DEVICE — SUTURE VCRL SZ 3-0 L18IN ABSRB UD L26MM SH 1/2 CIR J864D

## (undated) DEVICE — SUTURE ETHBND EXCEL SZ 0 L18IN NONABSORBABLE GRN L26MM CT-2 CX27D

## (undated) DEVICE — SUTURE ABSORBABLE MONOFILAMENT 0 CT-2 12 IN STRATAFIX SPRL SXPP1B405

## (undated) DEVICE — SUTURE V-LOC 90 3-0 L6IN ABSRB UD L26MM V-20 1/2 CIR TAPR VLOCM2004

## (undated) DEVICE — BLADE CLIPPER GEN PURP NS

## (undated) DEVICE — TIP COVER ACCESSORY

## (undated) DEVICE — CANNULA SEAL